# Patient Record
Sex: FEMALE | Race: WHITE | Employment: UNEMPLOYED | URBAN - METROPOLITAN AREA
[De-identification: names, ages, dates, MRNs, and addresses within clinical notes are randomized per-mention and may not be internally consistent; named-entity substitution may affect disease eponyms.]

---

## 2017-10-04 ENCOUNTER — TELEPHONE (OUTPATIENT)
Dept: FAMILY MEDICINE CLINIC | Age: 42
End: 2017-10-04

## 2017-10-04 NOTE — TELEPHONE ENCOUNTER
Hayley's sister is a current patient of  (Sintia Hazel) and would like to know if there is anyway  would accept her as a patient. She has [employer] and no chronic conditions but has been experiencing some leg pain.

## 2017-10-11 ENCOUNTER — OFFICE VISIT (OUTPATIENT)
Dept: FAMILY MEDICINE CLINIC | Age: 42
End: 2017-10-11

## 2017-10-11 VITALS
OXYGEN SATURATION: 99 % | SYSTOLIC BLOOD PRESSURE: 120 MMHG | HEART RATE: 76 BPM | WEIGHT: 196 LBS | DIASTOLIC BLOOD PRESSURE: 77 MMHG | BODY MASS INDEX: 27.44 KG/M2 | HEIGHT: 71 IN

## 2017-10-11 DIAGNOSIS — T14.8XXA BRUISING: ICD-10-CM

## 2017-10-11 DIAGNOSIS — Z23 NEEDS FLU SHOT: ICD-10-CM

## 2017-10-11 DIAGNOSIS — Z00.00 ROUTINE GENERAL MEDICAL EXAMINATION AT A HEALTH CARE FACILITY: Primary | ICD-10-CM

## 2017-10-11 DIAGNOSIS — M25.562 POSTERIOR LEFT KNEE PAIN: ICD-10-CM

## 2017-10-11 DIAGNOSIS — D18.02 INTRACRANIAL CAVERNOUS HEMANGIOMA (HCC): ICD-10-CM

## 2017-10-11 DIAGNOSIS — R00.2 PALPITATIONS: ICD-10-CM

## 2017-10-11 DIAGNOSIS — R79.89 DECREASED THYROID STIMULATING HORMONE (TSH) LEVEL: ICD-10-CM

## 2017-10-11 DIAGNOSIS — R20.0 NUMBNESS OF RIGHT FOOT: ICD-10-CM

## 2017-10-11 DIAGNOSIS — G43.909 MIGRAINE WITHOUT STATUS MIGRAINOSUS, NOT INTRACTABLE, UNSPECIFIED MIGRAINE TYPE: ICD-10-CM

## 2017-10-11 LAB
A/G RATIO: 1.4 (ref 1.1–2.2)
ALBUMIN SERPL-MCNC: 4.6 G/DL (ref 3.4–5)
ALP BLD-CCNC: 49 U/L (ref 40–129)
ALT SERPL-CCNC: 8 U/L (ref 10–40)
ANION GAP SERPL CALCULATED.3IONS-SCNC: 13 MMOL/L (ref 3–16)
APTT: 29.7 SEC (ref 24.1–34.9)
AST SERPL-CCNC: 12 U/L (ref 15–37)
BILIRUB SERPL-MCNC: 0.3 MG/DL (ref 0–1)
BUN BLDV-MCNC: 18 MG/DL (ref 7–20)
CALCIUM SERPL-MCNC: 9.8 MG/DL (ref 8.3–10.6)
CHLORIDE BLD-SCNC: 100 MMOL/L (ref 99–110)
CO2: 28 MMOL/L (ref 21–32)
CREAT SERPL-MCNC: 0.8 MG/DL (ref 0.6–1.1)
D DIMER: 262 NG/ML DDU (ref 0–229)
GFR AFRICAN AMERICAN: >60
GFR NON-AFRICAN AMERICAN: >60
GLOBULIN: 3.3 G/DL
GLUCOSE BLD-MCNC: 104 MG/DL (ref 70–99)
HCT VFR BLD CALC: 41.6 % (ref 36–48)
HEMOGLOBIN: 13.9 G/DL (ref 12–16)
INR BLD: 0.97 (ref 0.85–1.15)
MCH RBC QN AUTO: 30 PG (ref 26–34)
MCHC RBC AUTO-ENTMCNC: 33.5 G/DL (ref 31–36)
MCV RBC AUTO: 89.5 FL (ref 80–100)
PDW BLD-RTO: 13.7 % (ref 12.4–15.4)
PLATELET # BLD: 225 K/UL (ref 135–450)
PMV BLD AUTO: 10.2 FL (ref 5–10.5)
POTASSIUM SERPL-SCNC: 4.3 MMOL/L (ref 3.5–5.1)
PROTHROMBIN TIME: 11 SEC (ref 9.6–13)
RBC # BLD: 4.65 M/UL (ref 4–5.2)
SODIUM BLD-SCNC: 141 MMOL/L (ref 136–145)
T4 FREE: 1 NG/DL (ref 0.9–1.8)
THYROID PEROXIDASE (TPO) ABS: 19 IU/ML
TOTAL PROTEIN: 7.9 G/DL (ref 6.4–8.2)
TSH SERPL DL<=0.05 MIU/L-ACNC: 2.13 UIU/ML (ref 0.27–4.2)
WBC # BLD: 7.1 K/UL (ref 4–11)

## 2017-10-11 PROCEDURE — 99396 PREV VISIT EST AGE 40-64: CPT | Performed by: FAMILY MEDICINE

## 2017-10-11 PROCEDURE — 90686 IIV4 VACC NO PRSV 0.5 ML IM: CPT | Performed by: FAMILY MEDICINE

## 2017-10-11 PROCEDURE — 93000 ELECTROCARDIOGRAM COMPLETE: CPT | Performed by: FAMILY MEDICINE

## 2017-10-11 PROCEDURE — 90471 IMMUNIZATION ADMIN: CPT | Performed by: FAMILY MEDICINE

## 2017-10-11 RX ORDER — CLOMIPRAMINE HYDROCHLORIDE 50 MG/1
50 CAPSULE ORAL NIGHTLY
COMMUNITY
End: 2017-10-11 | Stop reason: CLARIF

## 2017-10-11 ASSESSMENT — PATIENT HEALTH QUESTIONNAIRE - PHQ9
SUM OF ALL RESPONSES TO PHQ9 QUESTIONS 1 & 2: 0
SUM OF ALL RESPONSES TO PHQ QUESTIONS 1-9: 0
1. LITTLE INTEREST OR PLEASURE IN DOING THINGS: 0
2. FEELING DOWN, DEPRESSED OR HOPELESS: 0

## 2017-10-11 NOTE — PROGRESS NOTES
SUBJECTIVE:   40 y.o. female for annual routine checkup. She is a new patient. Current Outpatient Prescriptions   Medication Sig Dispense Refill    clomiPHENE (CLOMID) 50 MG tablet 2 pills once per day 60 tablet 0     No current facility-administered medications for this visit. Allergies: Review of patient's allergies indicates no known allergies. No LMP recorded. Last PAP:due if  -GYN- Cr. Condorodis   History of Abnormal PAP: ASCUS only - ?? Prior mammogram: never  Sexually active: yes  Self breast exam:  Yes   LMP: 10/3/17 -   Smoker: no   Alcohol: not regular  Caffeine: 1-2 coffee/ day. Exercise: trying to be more regular 2-3d/ week. Her daughter is 3 yo. Her menses were q 28 days apart prior to daughter's birth. The last 6 months she has returned to q 1 month. Last 5 days . No bleeding between. . She had no problems with getting pregnant or with the pregnancy with 3 yo daughter. She had 2 miscarriages since then though. Her GYN recommended Clomid challenge. It was determined she was not ovulating per blood test. She has been referred to a fertility specialist - Dr. Dinesh Thomas on 10/23/17 . She was also referred to Dr. Mara Donaldson ,but has a call into his office. She has had 4 thyroid tests. She was seeing Dr. Kathia Willett for Gynecological care. Dr. Sonja Pop followed her during pregnancy. She gets palpitations occasionally , but only 3 times in her lifetime. Last occurred 3-4 months or so. Lasts < 30 seconds. No known precipitating factor. H/o migraines ,but last was 7-8 years ago. No regular headaches. Some bruising X 2 years or so. Some left hip and back pain X 5 months off and on . Some radiation to LLE to knee,but not daily. She had throbbing pain behind left knee last week and awoke her in the middle of the night. The next day she had bruising behind the knee. Right foot with coldness / numbness - great toe is the most noticeable.    No ulcerations. No calf pain or tenderness. No claudication. No visual problems or dizziness. No shortness of breath or chest pain . ROS:  Feeling well. No dyspnea or chest pain on exertion. No abdominal pain, change in bowel habits, black or bloody stools. No urinary tract symptoms. GYN ROS: normal menses, no abnormal bleeding, pelvic pain or discharge, no breast pain or new or enlarging lumps on self exam. No neurological complaints. See patient physical/  ROS questionnaire. Patient's allergies and medications were reviewed. Patient's past medical, surgical, social , and family history were reviewed. OBJECTIVE:   The patient appears well, alert, oriented x 3, in no distress, cooperative. /77 (Site: Left Arm, Position: Sitting, Cuff Size: Large Adult)   Pulse 76   Ht 5' 10.8\" (1.798 m)   Wt 196 lb (88.9 kg)   SpO2 99%   BMI 27.49 kg/m²   HEENT: normocephalic, atraumatic, PERRLA, EOMI, tympanic membranes and nasopharynx are normal.  Neck : supple. No adenopathy or thyromegaly. FROM. Upper extremities : DTRs 2+ biceps/ triceps/ brachioradialis bilateral.  FROM. Strength 5/5. Lungs are clear, good air entry, no wheezes, rhonchi or rales. Breathing comfortably. Cardiovascular: Regular rate  and rhythm. S1 and S2 are normal, no murmurs,rubs, and gallops. No edema. Abdomen is soft without tenderness, guarding, mass or organomegaly. Normal bowel sounds. Non distended. Back: Cervical, thoracic and lumbar spine exam is normal without tenderness, masses or kyphoscoliosis. Full range of motion without pain is noted. Lower extremities : DTRs 2+ knees and ankles bilateral.  FROM. Strength 5/5. Negative straight leg-raise. No edema or erythema bilateral.  normal peripheral pulses. Neuro: Cranial nerves 2-12 are normal. Deep tendon reflexes are 2+ and equal to all extremities. No focal sensory, or motor deficit noted. Skin: no rashes or suspicious lesions.      ASSESSMENT:   Daryl Marino

## 2017-10-11 NOTE — PROGRESS NOTES
Current Influenza vaccine VIS given to patient. Influenza consent form/questionnaire completed and signed. Patient responses to  Influenza consent form / questionnaire  reviewed. Vaccine given per protocol. Vaccine Information Sheet, \"Influenza - Inactivated\"  given to Ivan Paez, or parent/legal guardian of  Ivan Paez and verbalized understanding. Patient responses:    Have you ever had a reaction to a flu vaccine? No  Are you able to eat eggs without adverse effects? Yes  Do you have any current illness? No  Have you ever had Guillian Reedy Syndrome? No    Flu vaccine given per order. Please see immunization tab.

## 2017-10-12 DIAGNOSIS — R73.09 ELEVATED GLUCOSE: Primary | ICD-10-CM

## 2017-10-12 DIAGNOSIS — R79.89 DECREASED THYROID STIMULATING HORMONE (TSH) LEVEL: ICD-10-CM

## 2017-10-12 LAB
ESTIMATED AVERAGE GLUCOSE: 108.3 MG/DL
HBA1C MFR BLD: 5.4 %

## 2017-10-13 ENCOUNTER — TELEPHONE (OUTPATIENT)
Dept: FAMILY MEDICINE CLINIC | Age: 42
End: 2017-10-13

## 2017-10-13 ENCOUNTER — HOSPITAL ENCOUNTER (OUTPATIENT)
Dept: VASCULAR LAB | Age: 42
Discharge: HOME | End: 2017-10-13
Attending: FAMILY MEDICINE | Admitting: FAMILY MEDICINE

## 2017-10-13 DIAGNOSIS — M79.662 PAIN IN LEFT LOWER LEG: Primary | ICD-10-CM

## 2017-10-13 DIAGNOSIS — M25.562 PAIN IN LEFT KNEE: ICD-10-CM

## 2017-10-13 NOTE — TELEPHONE ENCOUNTER
The doppler is the better test. The D-dimer(blood test)  is not as specific and is therefore not as good of a test.

## 2017-10-17 ENCOUNTER — TELEPHONE (OUTPATIENT)
Dept: FAMILY MEDICINE CLINIC | Age: 42
End: 2017-10-17

## 2017-10-17 DIAGNOSIS — R20.0 NUMBNESS OF RIGHT FOOT: ICD-10-CM

## 2017-10-17 DIAGNOSIS — M79.605 LEFT LEG PAIN: Primary | ICD-10-CM

## 2017-10-17 NOTE — TELEPHONE ENCOUNTER
Please have her schedule EMG and have her schedule an appointment after completed several days later. If pain is severe or fever or edema , have her schedule an appointment prior to EMG test. [facility]

## 2017-10-18 NOTE — TELEPHONE ENCOUNTER
Spoke with patient, she is aware of EMG & what Dr. Leandra Bamberger is wanting to look at.  Patient is made an appointment to review all with Dr. Leandra Bamberger on Friday 10/20/17 @ 9:20am.

## 2017-10-20 ENCOUNTER — OFFICE VISIT (OUTPATIENT)
Dept: FAMILY MEDICINE CLINIC | Age: 42
End: 2017-10-20

## 2017-10-20 VITALS
HEART RATE: 80 BPM | BODY MASS INDEX: 27.55 KG/M2 | RESPIRATION RATE: 16 BRPM | TEMPERATURE: 98.1 F | OXYGEN SATURATION: 97 % | DIASTOLIC BLOOD PRESSURE: 74 MMHG | WEIGHT: 196.4 LBS | SYSTOLIC BLOOD PRESSURE: 106 MMHG

## 2017-10-20 DIAGNOSIS — M70.72 BURSITIS OF LEFT HIP, UNSPECIFIED BURSA: ICD-10-CM

## 2017-10-20 DIAGNOSIS — M76.32 ILIOTIBIAL BAND SYNDROME OF LEFT SIDE: Primary | ICD-10-CM

## 2017-10-20 PROCEDURE — 99214 OFFICE O/P EST MOD 30 MIN: CPT | Performed by: FAMILY MEDICINE

## 2017-10-20 NOTE — PATIENT INSTRUCTIONS
Version: 11.3  © 5363-0131 [employer]. Care instructions adapted under license by [facility] ([facility]). If you have questions about a medical condition or this instruction, always ask your healthcare professional. [address] any warranty or liability for your use of this information.

## 2017-10-20 NOTE — PROGRESS NOTES
distended. No hepatosplenomegaly. No CVA tenderness. Back: non tender. negative straight leg-raise . No pain with flexion, hyperextension or rotation. Lower extremities : DTRs 2+ knees and ankles bilateral.  FROM. Strength 5/5. Negative straight leg-raise. No edema or erythema bilateral. Tenderness to lateral left leg - lateral thigh and inferior to greater trochanter . Skin: no rashes. Non tender. ASSESSMENT/  PLAN:  Candis Siddiqi was seen today for other and hip pain. Diagnoses and all orders for this visit:    Iliotibial band syndrome of left side   -   Moist heat . ROM exercises- handout given. Modify activities. -   If not pregnant, can take Motrin 600 mg qid prn or Aleve 1-2 po bid prn.    -   Hold on EMG or Physical Therapy, but discussed. Bursitis of left hip, unspecified bursa   -   Moist heat . ROM exercises. Modify activities. -   If not pregnant, can take Motrin 600 mg qid prn or Aleve 1-2 po bid prn. Follow up if no improvement in 4 weeks/ as needed for increased symptoms.

## 2017-12-12 ENCOUNTER — TELEPHONE (OUTPATIENT)
Dept: FAMILY MEDICINE CLINIC | Age: 42
End: 2017-12-12

## 2017-12-12 NOTE — TELEPHONE ENCOUNTER
Pt called:  About 1 wk ago pt had what she thought was a blister on outside of lt ankle just under the ankle bone. About 2 days ago the spot started to itch and is now in the shape of a Z or 2. No drainage, not hot or warm,no fever, just itches. Pt did go on a curse from 11/19/2017 - retuning to [place] on 11/25/2017. Pt visited the [facility], [facility], and [place]. Blister/rash did not start till around 12/3/2017. Pt would like to know if she should try a OTC cream?    Pt is horace'd for tomorrow w/SM, however pt stated that if she did not need to be seen and could try OTC cream then she do that. Please advise.  Thanks

## 2017-12-13 ENCOUNTER — OFFICE VISIT (OUTPATIENT)
Dept: FAMILY MEDICINE CLINIC | Age: 42
End: 2017-12-13

## 2017-12-13 VITALS
OXYGEN SATURATION: 99 % | SYSTOLIC BLOOD PRESSURE: 101 MMHG | HEART RATE: 78 BPM | BODY MASS INDEX: 28.19 KG/M2 | WEIGHT: 201 LBS | DIASTOLIC BLOOD PRESSURE: 66 MMHG

## 2017-12-13 DIAGNOSIS — B35.4 TINEA CORPORIS: Primary | ICD-10-CM

## 2017-12-13 PROCEDURE — 99213 OFFICE O/P EST LOW 20 MIN: CPT | Performed by: FAMILY MEDICINE

## 2017-12-13 RX ORDER — LANOLIN ALCOHOL/MO/W.PET/CERES
50 CREAM (GRAM) TOPICAL DAILY
COMMUNITY
End: 2021-10-27

## 2017-12-13 NOTE — LETTER
[address] [place] [address] [place] [address]  Phone: [phone]  Fax: [phone]    Henny Sales MD                                                                                   December 13, 2017                       [address]      Dear Luan Fothergill:    Thank you for enrolling in [address] Please follow the instructions below to securely access your online medical record. [employer] allows you to send messages to your doctor, view your test results, renew your prescriptions, schedule appointments, and more. How Do I Sign Up? 1. In your Internet browser, go to [email or web address]  2. Click on the Sign Up Now link in the Sign In box. You will see the New Member Sign Up page. 3. Enter your [employer] Access Code exactly as it appears below. You will not need to use this code after youve completed the sign-up process. If you do not sign up before the expiration date, you must request a new code. [employer] Access Code: [email or web address]  Expires: 2/11/2018  3:32 PM    4. Enter your Social Security Number ([id number]) and Date of Birth (mm/dd/yyyy) as indicated and click Submit. You will be taken to the next sign-up page. 5. Create a [employer] ID. This will be your [employer] login ID and cannot be changed, so think of one that is secure and easy to remember. 6. Create a [employer] password. You can change your password at any time. 7. Enter your Password Reset Question and Answer. This can be used at a later time if you forget your password. 8. Enter your e-mail address. You will receive e-mail notification when new information is available in [address] 9. Click Sign Up. You can now view your medical record. Additional Information  If you have questions, please contact the physician practice where you receive care. Remember, [employer] is NOT to be used for urgent needs. For medical emergencies, dial 911. For questions regarding your [employer] account call [phone]. If you have a clinical question, please call your doctor's office.     Sincerely,  Cheyenne Espinoza MD

## 2018-01-04 ENCOUNTER — TELEPHONE (OUTPATIENT)
Dept: FAMILY MEDICINE CLINIC | Age: 43
End: 2018-01-04

## 2018-01-04 NOTE — TELEPHONE ENCOUNTER
As long as improving slowly and not increasing in size, I would continue Econazole cream prescribed. If increased size, this should likely be switched. It often takes 4+ weeks to resolve. Please ensure improving. Patient was seen by Dr. Angelo Ramos on 12/13/17.

## 2018-01-04 NOTE — TELEPHONE ENCOUNTER
Pt had appt on 12/13/17 regarding wingworm. Pt would like to know if she needs another medication or she needs to wait it out. Pt is having spots that go away and then more come in. She also states that she is aware that Dr. Shwetha Hairston states that it takes up to 3 weeks to heal and some meds do not work. Please advise. Thanks.

## 2018-01-05 RX ORDER — PRENATAL VIT 91/IRON/FOLIC/DHA 28-975-200
COMBINATION PACKAGE (EA) ORAL
Qty: 42 G | Refills: 0 | Status: SHIPPED | OUTPATIENT
Start: 2018-01-05 | End: 2021-10-27

## 2018-01-27 ENCOUNTER — PATIENT MESSAGE (OUTPATIENT)
Dept: FAMILY MEDICINE CLINIC | Age: 43
End: 2018-01-27

## 2018-01-30 ENCOUNTER — OFFICE VISIT (OUTPATIENT)
Dept: FAMILY MEDICINE CLINIC | Age: 43
End: 2018-01-30

## 2018-01-30 VITALS
DIASTOLIC BLOOD PRESSURE: 74 MMHG | TEMPERATURE: 98.9 F | RESPIRATION RATE: 14 BRPM | SYSTOLIC BLOOD PRESSURE: 109 MMHG | BODY MASS INDEX: 28.28 KG/M2 | WEIGHT: 201.6 LBS | HEART RATE: 105 BPM

## 2018-01-30 DIAGNOSIS — L30.1 DYSHIDROSIS: Primary | ICD-10-CM

## 2018-01-30 DIAGNOSIS — R21 RASH: ICD-10-CM

## 2018-01-30 PROCEDURE — 99213 OFFICE O/P EST LOW 20 MIN: CPT | Performed by: FAMILY MEDICINE

## 2018-01-30 RX ORDER — MOMETASONE FUROATE 1 MG/G
CREAM TOPICAL
Qty: 45 G | Refills: 0 | Status: SHIPPED | OUTPATIENT
Start: 2018-01-30 | End: 2021-10-27

## 2018-02-05 ENCOUNTER — PATIENT MESSAGE (OUTPATIENT)
Dept: FAMILY MEDICINE CLINIC | Age: 43
End: 2018-02-05

## 2018-02-05 ENCOUNTER — OFFICE VISIT (OUTPATIENT)
Dept: FAMILY MEDICINE CLINIC | Age: 43
End: 2018-02-05

## 2018-02-05 VITALS
RESPIRATION RATE: 14 BRPM | TEMPERATURE: 98.9 F | BODY MASS INDEX: 27.91 KG/M2 | OXYGEN SATURATION: 99 % | HEART RATE: 92 BPM | SYSTOLIC BLOOD PRESSURE: 113 MMHG | WEIGHT: 199 LBS | DIASTOLIC BLOOD PRESSURE: 69 MMHG

## 2018-02-05 DIAGNOSIS — R52 BODY ACHES: Primary | ICD-10-CM

## 2018-02-05 DIAGNOSIS — B76.9: ICD-10-CM

## 2018-02-05 DIAGNOSIS — J06.9 UPPER RESPIRATORY TRACT INFECTION, UNSPECIFIED TYPE: ICD-10-CM

## 2018-02-05 LAB
INFLUENZA A ANTIBODY: NEGATIVE
INFLUENZA B ANTIBODY: NORMAL

## 2018-02-05 PROCEDURE — 87804 INFLUENZA ASSAY W/OPTIC: CPT | Performed by: FAMILY MEDICINE

## 2018-02-05 PROCEDURE — 99213 OFFICE O/P EST LOW 20 MIN: CPT | Performed by: FAMILY MEDICINE

## 2018-02-05 RX ORDER — UBIDECARENONE 75 MG
CAPSULE ORAL
COMMUNITY
End: 2021-10-27

## 2018-02-05 NOTE — PROGRESS NOTES
fluids - water. -    Hold on antibiotics. Hookworm disease        -    S/p treatment per dermatologist . (Albendazole ? ). F/u if no improvement 4-5d/ prn increased symptoms/ otherwise if fever > 48 hours .

## 2018-02-06 NOTE — TELEPHONE ENCOUNTER
From: Sid Manrique  To: Berenice Waldron MD  Sent: 2/5/2018 6:29 PM EST  Subject: Visit Follow-Up Question    I have a question about POCT INFLUENZA A/B resulted on 2/5/18, 4:51 PM.    There is only one test that has a result of negative.  What about the other test?

## 2018-02-08 ENCOUNTER — PATIENT MESSAGE (OUTPATIENT)
Dept: FAMILY MEDICINE CLINIC | Age: 43
End: 2018-02-08

## 2018-02-08 NOTE — TELEPHONE ENCOUNTER
From: Dewayne Goodpasture  To: Ivonne Ruvalcaba MD  Sent: 2/8/2018 10:47 AM EST  Subject: Visit Follow-Up Question    My left ear is now hurting and both feel clogged. I also have congestion and sinus pressure. I am flying a week from today and need this gone ASAP. What can we do?    ----- Message -----  From: Floresita Lili  Sent: 2/6/18, 11:25 AM  To: Dewayne Goodpasture  Subject: RE: Visit Follow-Up Question    Jose Becerrai,  When we do a flu test in the office and it comes out negative for A/B we put it in the spot that appears first. I did verify with the medical assistant that ran your test and she confirmed it was negative for both. Have a great day! Rylie Dumas     ----- Message -----   From: Dewayne Goodpasture   Sent: 2/5/2018 6:29 PM EST   To: Ivonne Ruvalcaba MD  Subject: Visit Follow-Up Question    I have a question about POCT INFLUENZA A/B resulted on 2/5/18, 4:51 PM.    There is only one test that has a result of negative.  What about the other test?

## 2018-02-08 NOTE — TELEPHONE ENCOUNTER
Patient aware.  She is reluctant to try any nasal spray but will cont with OTC therapy and will call for appt if any sharp pains in the ear occur or if sxs are not better by Monday

## 2018-10-02 ENCOUNTER — TELEPHONE (OUTPATIENT)
Dept: FAMILY MEDICINE CLINIC | Age: 43
End: 2018-10-02

## 2018-10-03 ENCOUNTER — OFFICE VISIT (OUTPATIENT)
Dept: FAMILY MEDICINE CLINIC | Age: 43
End: 2018-10-03
Payer: COMMERCIAL

## 2018-10-03 VITALS
OXYGEN SATURATION: 98 % | SYSTOLIC BLOOD PRESSURE: 101 MMHG | HEART RATE: 81 BPM | RESPIRATION RATE: 16 BRPM | WEIGHT: 200.5 LBS | BODY MASS INDEX: 28.12 KG/M2 | TEMPERATURE: 99.3 F | DIASTOLIC BLOOD PRESSURE: 65 MMHG

## 2018-10-03 DIAGNOSIS — R42 DIZZINESS: Primary | ICD-10-CM

## 2018-10-03 DIAGNOSIS — J30.9 ALLERGIC RHINITIS, UNSPECIFIED SEASONALITY, UNSPECIFIED TRIGGER: ICD-10-CM

## 2018-10-03 DIAGNOSIS — N92.6 IRREGULAR MENSES: ICD-10-CM

## 2018-10-03 LAB
ALBUMIN SERPL-MCNC: 4.7 G/DL (ref 3.5–5)
ALP BLD-CCNC: 49 IU/L (ref 35–135)
ALT SERPL-CCNC: 14 IU/L (ref 10–60)
ANION GAP SERPL CALCULATED.3IONS-SCNC: 7 MMOL/L (ref 6–18)
AST SERPL-CCNC: 15 IU/L (ref 10–40)
BILIRUB SERPL-MCNC: 0.6 MG/DL (ref 0–1.2)
BUN BLDV-MCNC: 13 MG/DL (ref 8–26)
CALCIUM SERPL-MCNC: 9.3 MG/DL (ref 8.5–10.5)
CHLORIDE BLD-SCNC: 101 MEQ/L (ref 101–111)
CO2: 28 MMOL/L (ref 24–36)
CREAT SERPL-MCNC: 0.81 MG/DL (ref 0.44–1.03)
GFR AFRICAN AMERICAN: 102 ML/MIN/1.73 M2
GFR NON-AFRICAN AMERICAN: 89 ML/MIN/1.73 M2
GLUCOSE BLD-MCNC: 117 MG/DL (ref 70–99)
HCT VFR BLD CALC: 41.8 % (ref 36–46)
HEMOGLOBIN: 14.1 G/DL (ref 12–15.2)
MCH RBC QN AUTO: 30.2 PG (ref 27–33)
MCHC RBC AUTO-ENTMCNC: 33.7 G/DL (ref 32–36)
MCV RBC AUTO: 89.6 FL (ref 82–97)
PDW BLD-RTO: 12.9 %
PLATELET # BLD: 234 THOU/MCL (ref 140–375)
PMV BLD AUTO: 9.9 FL (ref 7.4–11.5)
POTASSIUM SERPL-SCNC: 4.3 MEQ/L (ref 3.6–5.1)
RBC # BLD: 4.66 MIL/MCL (ref 3.8–5.2)
SODIUM BLD-SCNC: 136 MEQ/L (ref 135–145)
TOTAL PROTEIN: 7.8 G/DL (ref 6–8)
WBC # BLD: 5.3 THOU/MCL (ref 3.6–10.5)

## 2018-10-03 PROCEDURE — 99214 OFFICE O/P EST MOD 30 MIN: CPT | Performed by: FAMILY MEDICINE

## 2018-10-03 RX ORDER — CETIRIZINE HYDROCHLORIDE 5 MG/1
5 TABLET ORAL DAILY
COMMUNITY
End: 2021-10-27

## 2018-10-03 ASSESSMENT — PATIENT HEALTH QUESTIONNAIRE - PHQ9
SUM OF ALL RESPONSES TO PHQ9 QUESTIONS 1 & 2: 0
SUM OF ALL RESPONSES TO PHQ QUESTIONS 1-9: 0
1. LITTLE INTEREST OR PLEASURE IN DOING THINGS: 0
2. FEELING DOWN, DEPRESSED OR HOPELESS: 0
SUM OF ALL RESPONSES TO PHQ QUESTIONS 1-9: 0

## 2018-10-03 NOTE — PROGRESS NOTES
labs-  CBC; Future,  Comprehensive Metabolic Panel; Future  - patient has orders for TSH from GYN. 2. Irregular menses  - has orders for TSH, FSH from Gynecology . Advised follow up with Gynecologist.     3. Allergic rhinitis, unspecified seasonality, unspecified trigger  - trial of Flonase NS qhs. May contribute some to dizziness. Monitor.   - Claritin or Allegra qd prn. Follow up if no improvement in 2 weeks/ as needed for increased symptoms.

## 2018-10-04 DIAGNOSIS — R73.09 ELEVATED GLUCOSE: Primary | ICD-10-CM

## 2018-10-05 ENCOUNTER — TELEPHONE (OUTPATIENT)
Dept: FAMILY MEDICINE CLINIC | Age: 43
End: 2018-10-05

## 2018-10-05 LAB
ESTIMATED AVERAGE GLUCOSE: 108 MG/DL
HBA1C MFR BLD: 5.4 % (ref 4.2–5.6)

## 2018-10-05 NOTE — TELEPHONE ENCOUNTER
Likely the sharp pain is musculoskeletal strain in nature. Have her take Aleve 1-2 po bid prn over the weekend. Advise to take with food. If no improvement advise follow up next week. If shortness of breath or significant chest pain occur, advised to go to ED. Offer an appointment if she prefers. My chart message stated HgbA1c , averages Blood sugars over a 3 month period. She had an elevated glucose in 2017 also.

## 2019-07-24 ENCOUNTER — OFFICE VISIT (OUTPATIENT)
Dept: FAMILY MEDICINE CLINIC | Age: 44
End: 2019-07-24
Payer: COMMERCIAL

## 2019-07-24 ENCOUNTER — HOSPITAL ENCOUNTER (OUTPATIENT)
Dept: VASCULAR LAB | Age: 44
Discharge: HOME | End: 2019-07-24
Payer: COMMERCIAL

## 2019-07-24 ENCOUNTER — TELEPHONE (OUTPATIENT)
Dept: FAMILY MEDICINE CLINIC | Age: 44
End: 2019-07-24

## 2019-07-24 VITALS
DIASTOLIC BLOOD PRESSURE: 62 MMHG | WEIGHT: 194.6 LBS | OXYGEN SATURATION: 97 % | SYSTOLIC BLOOD PRESSURE: 101 MMHG | RESPIRATION RATE: 12 BRPM | BODY MASS INDEX: 27.29 KG/M2 | TEMPERATURE: 99.7 F | HEART RATE: 73 BPM

## 2019-07-24 DIAGNOSIS — M79.602 LEFT ARM PAIN: Primary | ICD-10-CM

## 2019-07-24 DIAGNOSIS — R22.32 LOCALIZED SWELLING, MASS AND LUMP, LEFT UPPER LIMB: ICD-10-CM

## 2019-07-24 PROCEDURE — 99214 OFFICE O/P EST MOD 30 MIN: CPT | Performed by: FAMILY MEDICINE

## 2019-07-24 PROCEDURE — 93971 EXTREMITY STUDY: CPT

## 2019-07-24 ASSESSMENT — PATIENT HEALTH QUESTIONNAIRE - PHQ9
SUM OF ALL RESPONSES TO PHQ QUESTIONS 1-9: 0
SUM OF ALL RESPONSES TO PHQ QUESTIONS 1-9: 0
2. FEELING DOWN, DEPRESSED OR HOPELESS: 0
1. LITTLE INTEREST OR PLEASURE IN DOING THINGS: 0
SUM OF ALL RESPONSES TO PHQ9 QUESTIONS 1 & 2: 0

## 2019-09-06 ENCOUNTER — OFFICE VISIT (OUTPATIENT)
Dept: FAMILY MEDICINE CLINIC | Age: 44
End: 2019-09-06
Payer: COMMERCIAL

## 2019-09-06 VITALS
SYSTOLIC BLOOD PRESSURE: 104 MMHG | HEART RATE: 70 BPM | HEIGHT: 69 IN | BODY MASS INDEX: 29.86 KG/M2 | DIASTOLIC BLOOD PRESSURE: 67 MMHG | WEIGHT: 201.6 LBS | OXYGEN SATURATION: 98 %

## 2019-09-06 DIAGNOSIS — I80.8 SUPERFICIAL THROMBOPHLEBITIS OF LEFT UPPER EXTREMITY: Primary | ICD-10-CM

## 2019-09-06 DIAGNOSIS — G43.909 MIGRAINE WITHOUT STATUS MIGRAINOSUS, NOT INTRACTABLE, UNSPECIFIED MIGRAINE TYPE: ICD-10-CM

## 2019-09-06 PROBLEM — O09.90 SUPERVISION OF HIGH RISK PREGNANCY, ANTEPARTUM: Status: ACTIVE | Noted: 2019-01-09

## 2019-09-06 PROBLEM — O03.9 FETAL DEMISE DUE TO MISCARRIAGE: Status: ACTIVE | Noted: 2017-04-22

## 2019-09-06 PROBLEM — O34.219 HISTORY OF CESAREAN SECTION COMPLICATING PREGNANCY: Status: ACTIVE | Noted: 2019-03-06

## 2019-09-06 PROBLEM — J30.9 ALLERGIC RHINITIS: Status: ACTIVE | Noted: 2019-09-06

## 2019-09-06 PROBLEM — O09.521 ELDERLY MULTIGRAVIDA IN FIRST TRIMESTER: Status: ACTIVE | Noted: 2018-12-04

## 2019-09-06 PROBLEM — R79.89 ABNORMAL TSH: Status: ACTIVE | Noted: 2017-05-03

## 2019-09-06 PROBLEM — D18.00 CAVERNOUS HEMANGIOMA: Status: ACTIVE | Noted: 2019-09-06

## 2019-09-06 PROCEDURE — 99213 OFFICE O/P EST LOW 20 MIN: CPT | Performed by: FAMILY MEDICINE

## 2019-09-06 RX ORDER — OXYCODONE HYDROCHLORIDE 5 MG/1
TABLET ORAL
Refills: 0 | COMMUNITY
Start: 2019-06-27 | End: 2021-10-27

## 2019-09-06 RX ORDER — PRENATAL VIT 27,CALC/IRON/FA 60 MG-1 MG
1 TABLET ORAL
COMMUNITY
End: 2021-10-27

## 2019-09-06 RX ORDER — ASPIRIN 325 MG
325 TABLET ORAL
COMMUNITY
End: 2019-09-06

## 2019-09-06 NOTE — PROGRESS NOTES
Patient is here for follow up of left arm superficial clots. She had IV placed 19 for   . No left arm pain. Lumps are smaller now. No longer tender. Taking Aspirin 325 mg qd. No redness no swelling. No fever. Migraines are stable. No headache today. Review of Systems    ROS: All other systems were reviewed and are negative . Patient's allergies and medications were reviewed. Patient's past medical, surgical, social , and family history were reviewed. OBJECTIVE:  /67 (Site: Right Upper Arm, Position: Sitting, Cuff Size: Medium Adult)   Pulse 70   Ht 5' 9\" (1.753 m)   Wt 201 lb 9.6 oz (91.4 kg)   SpO2 98%   BMI 29.77 kg/m²     Physical Exam    General: NAD, cooperative, alert and oriented X 3. Mood / affect is good. good insight. well hydrated. Neck : no lymphadenopathy, supple, FROM  CV: Regular rate and rhythm , no murmurs/ rub/ gallop. No edema. Lungs : CTA bilaterally, breathing comfortably  Abdomen: positive bowel sounds, soft , non tender, non distended. No hepatosplenomegaly. No CVA tenderness. LUE : 1 cm nodule to forearm X 2 . Non tender. No erythema. No edema. Skin: no rashes. Non tender. ASSESSMENT/  PLAN:  1. Superficial thrombophlebitis of left upper extremity  - given > 30 days out and decreased size and non tender, stop Aspirin.   - monitor. 2. Migraine without status migrainosus, not intractable, unspecified migraine type  - stable. Follow up 6 months/ prn.

## 2019-11-19 ENCOUNTER — TELEPHONE (OUTPATIENT)
Dept: FAMILY MEDICINE CLINIC | Age: 44
End: 2019-11-19

## 2019-11-20 PROBLEM — I48.91 ATRIAL FIBRILLATION (HCC): Status: ACTIVE | Noted: 2019-11-01

## 2019-11-21 ENCOUNTER — OFFICE VISIT (OUTPATIENT)
Dept: FAMILY MEDICINE CLINIC | Age: 44
End: 2019-11-21
Payer: COMMERCIAL

## 2019-11-21 ENCOUNTER — TELEPHONE (OUTPATIENT)
Dept: FAMILY MEDICINE CLINIC | Age: 44
End: 2019-11-21

## 2019-11-21 VITALS
TEMPERATURE: 96.6 F | WEIGHT: 204.4 LBS | DIASTOLIC BLOOD PRESSURE: 72 MMHG | SYSTOLIC BLOOD PRESSURE: 110 MMHG | BODY MASS INDEX: 30.18 KG/M2 | HEART RATE: 98 BPM | OXYGEN SATURATION: 97 %

## 2019-11-21 DIAGNOSIS — N96 HISTORY OF RECURRENT MISCARRIAGES: ICD-10-CM

## 2019-11-21 DIAGNOSIS — E05.90 HYPERTHYROIDISM: Primary | ICD-10-CM

## 2019-11-21 DIAGNOSIS — I48.91 NEW ONSET ATRIAL FIBRILLATION (HCC): ICD-10-CM

## 2019-11-21 PROCEDURE — 1111F DSCHRG MED/CURRENT MED MERGE: CPT | Performed by: FAMILY MEDICINE

## 2019-11-21 PROCEDURE — 99214 OFFICE O/P EST MOD 30 MIN: CPT | Performed by: FAMILY MEDICINE

## 2019-11-22 ENCOUNTER — TELEPHONE (OUTPATIENT)
Dept: ENDOCRINOLOGY | Age: 44
End: 2019-11-22

## 2020-01-31 ENCOUNTER — OFFICE VISIT (OUTPATIENT)
Dept: FAMILY MEDICINE CLINIC | Age: 45
End: 2020-01-31
Payer: COMMERCIAL

## 2020-01-31 ENCOUNTER — TELEPHONE (OUTPATIENT)
Dept: FAMILY MEDICINE CLINIC | Age: 45
End: 2020-01-31

## 2020-01-31 VITALS
HEART RATE: 71 BPM | WEIGHT: 209 LBS | SYSTOLIC BLOOD PRESSURE: 122 MMHG | RESPIRATION RATE: 16 BRPM | BODY MASS INDEX: 30.86 KG/M2 | DIASTOLIC BLOOD PRESSURE: 80 MMHG | OXYGEN SATURATION: 99 %

## 2020-01-31 PROCEDURE — 99214 OFFICE O/P EST MOD 30 MIN: CPT | Performed by: FAMILY MEDICINE

## 2020-01-31 NOTE — PROGRESS NOTES
benefits. Patient voiced understanding and agrees with use. Barriers to medication compliance addressed. All the patient's questions were addressed. - discussed likely postpartum is underlying given no change with becoming hypothyroid or when on Tapazole. - sertraline (ZOLOFT) 50 MG tablet; Take 1 tablet by mouth daily  Dispense: 30 tablet; Refill: 1    2. Atrial fibrillation, unspecified type ([address]  - Stable. (normal sinus rhythm today). - Continue Metoprolol 12.5 mg bid( 1/2 of 25 mg bid). 3. Hyperthyroidism  - off Tapazole due to hypothyroidism occurred. - recheck thyroid studies the week of 2/14/20.   - Followed by Endocrinologist, Dr. Dilip Tipton. Follow up 4 -6 weeks/ prn.

## 2020-03-12 ENCOUNTER — PATIENT MESSAGE (OUTPATIENT)
Dept: FAMILY MEDICINE CLINIC | Age: 45
End: 2020-03-12

## 2020-03-12 NOTE — TELEPHONE ENCOUNTER
From: Saadia Cook  To:  Donya Carrera MD  Sent: 3/12/2020 12:11 PM EDT  Subject: Prescription Question    I am requesting a 3 month supply of the Zoloft due to precaution of the covid19 virus

## 2020-10-01 ENCOUNTER — PATIENT MESSAGE (OUTPATIENT)
Dept: FAMILY MEDICINE CLINIC | Age: 45
End: 2020-10-01

## 2020-10-01 NOTE — TELEPHONE ENCOUNTER
From: Julian Patient  To: Rosana Saravia MD  Sent: 10/1/2020 11:37 AM EDT  Subject: Prescription Question    I need a refill for the sertraline please.

## 2020-12-11 ENCOUNTER — TELEPHONE (OUTPATIENT)
Dept: FAMILY MEDICINE CLINIC | Age: 45
End: 2020-12-11

## 2020-12-15 ENCOUNTER — TELEPHONE (OUTPATIENT)
Dept: FAMILY MEDICINE CLINIC | Age: 45
End: 2020-12-15

## 2020-12-15 NOTE — TELEPHONE ENCOUNTER
Pt advised   Push fluids - water. Get plenty of rest and eat well balanced. You should self quarantine for 14 days from last exposure and if you develop symptoms , it is recommended you quarantine for 10 days from onset of symptoms and be fever free for >72 hours, which ever is longer.   It is recommended you hold on being COVID tested until symptoms occur and last > 24 hours to avoid a false positive test result  Okay per mm

## 2020-12-15 NOTE — TELEPHONE ENCOUNTER
Hayley called stating she tested positive for Covid 19. Her symptoms began on Thursday with afib in the am (she thought it was just related to her thyroid), cold symptoms, and a lower back ache. She lost her sense of smell on Saturday and was tested for Covid 19. She does not have any breathing problems, fever, or diarrhea. She wants to know if Dr. Jeff Monique has any recommendations and she would like to know what precautions she should be taking. Please advise. Thanks.           Nicol Coulter [phone] (home)

## 2020-12-22 ENCOUNTER — PATIENT MESSAGE (OUTPATIENT)
Dept: FAMILY MEDICINE CLINIC | Age: 45
End: 2020-12-22

## 2020-12-22 NOTE — TELEPHONE ENCOUNTER
From: Van Bhupinder  To: Margaret Cornejo MD  Sent: 12/22/2020 11:17 AM EST  Subject: Non-Urgent Medical Question    I have tested positive for covid Tuesday December 15 (tested on the previous Saturday). Currently I have sinus/congestion, stiff neck/should blades, and a chest cough. No fever. Loss of smell/taste. I have an oximeter that I check and my oxygen has stayed around 95-97. Should I be doing anything else besides eating balance diet and staying hydrated. Will start on a daily claritin and gave a rescue inhaler to try as needed.   If symptoms are not improving recommended evaluation to have a physical exam once quarantine period is over. Can consider spirometry or imaging if needed at that time.

## 2020-12-28 NOTE — TELEPHONE ENCOUNTER
Please advise    Is there any way to get a doctors note from you stating that everyone in my household, specifically my  Bill Rosen, who was exposed to me, needs/needed to quarantine for 2 weeks? Thank you.

## 2020-12-29 NOTE — TELEPHONE ENCOUNTER
Tested on the 12th and results came on the 15th. Work note for  14-15 - 27. When the patient received her positive result, the patient's  was asked to go home and to not return until 12/30. Patient is asking for a note to be sent to her through [employer] for her  stating she tested positive and therefore the rest of the family had to stay in quarantine.

## 2020-12-30 NOTE — TELEPHONE ENCOUNTER
Okay to do note for  , I am assuming with RTW on 12/30/2020. I am not sure he is a patient here though. Ideally his doctor would write the note, but it is fine to write it if needed.

## 2021-01-01 DIAGNOSIS — F41.8 POSTPARTUM ANXIETY: ICD-10-CM

## 2021-03-15 DIAGNOSIS — F41.8 POSTPARTUM ANXIETY: ICD-10-CM

## 2021-03-16 ENCOUNTER — PATIENT MESSAGE (OUTPATIENT)
Dept: FAMILY MEDICINE CLINIC | Age: 46
End: 2021-03-16

## 2021-09-03 DIAGNOSIS — F41.8 POSTPARTUM ANXIETY: ICD-10-CM

## 2021-10-18 ENCOUNTER — PATIENT MESSAGE (OUTPATIENT)
Dept: FAMILY MEDICINE CLINIC | Age: 46
End: 2021-10-18

## 2021-10-18 DIAGNOSIS — F41.8 POSTPARTUM ANXIETY: ICD-10-CM

## 2021-10-19 NOTE — TELEPHONE ENCOUNTER
From: Lemuel Perez  To: Lesvia Valentin MD  Sent: 10/18/2021 3:12 PM EDT  Subject: Visit Follow-Up Question    I have scheduled my follow up but prior to that appointment I am to get labs done. Do I need to fast? Are there specific instructions? Also, I will run out of my Zoloft prior to my appointment, can I have a refill called into the pharmacy until my appointment?

## 2021-10-25 DIAGNOSIS — R73.09 ELEVATED GLUCOSE: ICD-10-CM

## 2021-10-25 DIAGNOSIS — I48.91 ATRIAL FIBRILLATION, UNSPECIFIED TYPE (HCC): ICD-10-CM

## 2021-10-25 DIAGNOSIS — R79.89 ABNORMAL TSH: ICD-10-CM

## 2021-10-25 DIAGNOSIS — Z13.220 SCREENING CHOLESTEROL LEVEL: ICD-10-CM

## 2021-10-25 LAB
A/G RATIO: 1.6 (ref 1.1–2.2)
ALBUMIN SERPL-MCNC: 4.6 G/DL (ref 3.4–5)
ALP BLD-CCNC: 67 U/L (ref 40–129)
ALT SERPL-CCNC: 9 U/L (ref 10–40)
ANION GAP SERPL CALCULATED.3IONS-SCNC: 10 MMOL/L (ref 3–16)
AST SERPL-CCNC: 12 U/L (ref 15–37)
BILIRUB SERPL-MCNC: 0.3 MG/DL (ref 0–1)
BUN BLDV-MCNC: 12 MG/DL (ref 7–20)
CALCIUM SERPL-MCNC: 9.4 MG/DL (ref 8.3–10.6)
CHLORIDE BLD-SCNC: 102 MMOL/L (ref 99–110)
CHOLESTEROL, FASTING: 189 MG/DL (ref 0–199)
CO2: 25 MMOL/L (ref 21–32)
CREAT SERPL-MCNC: 0.7 MG/DL (ref 0.6–1.1)
GFR AFRICAN AMERICAN: >60
GFR NON-AFRICAN AMERICAN: >60
GLOBULIN: 2.8 G/DL
GLUCOSE FASTING: 95 MG/DL (ref 70–99)
HCT VFR BLD CALC: 39.1 % (ref 36–48)
HDLC SERPL-MCNC: 57 MG/DL (ref 40–60)
HEMOGLOBIN: 12.7 G/DL (ref 12–16)
LDL CHOLESTEROL CALCULATED: 113 MG/DL
MCH RBC QN AUTO: 29.5 PG (ref 26–34)
MCHC RBC AUTO-ENTMCNC: 32.6 G/DL (ref 31–36)
MCV RBC AUTO: 90.6 FL (ref 80–100)
PDW BLD-RTO: 13.9 % (ref 12.4–15.4)
PLATELET # BLD: 238 K/UL (ref 135–450)
PMV BLD AUTO: 10 FL (ref 5–10.5)
POTASSIUM SERPL-SCNC: 4.7 MMOL/L (ref 3.5–5.1)
RBC # BLD: 4.32 M/UL (ref 4–5.2)
SODIUM BLD-SCNC: 137 MMOL/L (ref 136–145)
T4 FREE: 1.4 NG/DL (ref 0.9–1.8)
TOTAL PROTEIN: 7.4 G/DL (ref 6.4–8.2)
TRIGLYCERIDE, FASTING: 95 MG/DL (ref 0–150)
TSH SERPL DL<=0.05 MIU/L-ACNC: 0.19 UIU/ML (ref 0.27–4.2)
VLDLC SERPL CALC-MCNC: 19 MG/DL
WBC # BLD: 4.5 K/UL (ref 4–11)

## 2021-10-26 LAB
ESTIMATED AVERAGE GLUCOSE: 108.3 MG/DL
HBA1C MFR BLD: 5.4 %

## 2021-10-27 ENCOUNTER — OFFICE VISIT (OUTPATIENT)
Dept: FAMILY MEDICINE CLINIC | Age: 46
End: 2021-10-27
Payer: COMMERCIAL

## 2021-10-27 VITALS
WEIGHT: 219.6 LBS | BODY MASS INDEX: 32.43 KG/M2 | OXYGEN SATURATION: 99 % | TEMPERATURE: 97 F | HEART RATE: 75 BPM | SYSTOLIC BLOOD PRESSURE: 100 MMHG | DIASTOLIC BLOOD PRESSURE: 68 MMHG | RESPIRATION RATE: 15 BRPM

## 2021-10-27 DIAGNOSIS — Z12.11 COLON CANCER SCREENING: ICD-10-CM

## 2021-10-27 DIAGNOSIS — Z12.31 ENCOUNTER FOR SCREENING MAMMOGRAM FOR MALIGNANT NEOPLASM OF BREAST: ICD-10-CM

## 2021-10-27 DIAGNOSIS — Z23 NEEDS FLU SHOT: ICD-10-CM

## 2021-10-27 DIAGNOSIS — F41.8 POSTPARTUM ANXIETY: Primary | ICD-10-CM

## 2021-10-27 DIAGNOSIS — R73.09 ELEVATED GLUCOSE: ICD-10-CM

## 2021-10-27 DIAGNOSIS — I48.91 ATRIAL FIBRILLATION, UNSPECIFIED TYPE (HCC): ICD-10-CM

## 2021-10-27 DIAGNOSIS — E03.9 ACQUIRED HYPOTHYROIDISM: ICD-10-CM

## 2021-10-27 PROCEDURE — 90686 IIV4 VACC NO PRSV 0.5 ML IM: CPT | Performed by: FAMILY MEDICINE

## 2021-10-27 PROCEDURE — 99214 OFFICE O/P EST MOD 30 MIN: CPT | Performed by: FAMILY MEDICINE

## 2021-10-27 PROCEDURE — 90471 IMMUNIZATION ADMIN: CPT | Performed by: FAMILY MEDICINE

## 2021-10-27 RX ORDER — SERTRALINE HYDROCHLORIDE 100 MG/1
100 TABLET, FILM COATED ORAL DAILY
Qty: 30 TABLET | Refills: 1 | Status: SHIPPED | OUTPATIENT
Start: 2021-10-27 | End: 2022-04-17 | Stop reason: SDUPTHER

## 2021-10-27 RX ORDER — LEVOTHYROXINE SODIUM 0.05 MG/1
TABLET ORAL
COMMUNITY
Start: 2021-08-16 | End: 2022-09-22

## 2021-10-27 RX ORDER — METOPROLOL SUCCINATE 25 MG/1
25 TABLET, EXTENDED RELEASE ORAL DAILY
COMMUNITY
Start: 2021-05-18

## 2021-10-27 SDOH — ECONOMIC STABILITY: FOOD INSECURITY: WITHIN THE PAST 12 MONTHS, THE FOOD YOU BOUGHT JUST DIDN'T LAST AND YOU DIDN'T HAVE MONEY TO GET MORE.: NEVER TRUE

## 2021-10-27 SDOH — ECONOMIC STABILITY: FOOD INSECURITY: WITHIN THE PAST 12 MONTHS, YOU WORRIED THAT YOUR FOOD WOULD RUN OUT BEFORE YOU GOT MONEY TO BUY MORE.: NEVER TRUE

## 2021-10-27 ASSESSMENT — PATIENT HEALTH QUESTIONNAIRE - PHQ9
2. FEELING DOWN, DEPRESSED OR HOPELESS: 0
1. LITTLE INTEREST OR PLEASURE IN DOING THINGS: 0
SUM OF ALL RESPONSES TO PHQ QUESTIONS 1-9: 0
SUM OF ALL RESPONSES TO PHQ9 QUESTIONS 1 & 2: 0
SUM OF ALL RESPONSES TO PHQ QUESTIONS 1-9: 0
SUM OF ALL RESPONSES TO PHQ QUESTIONS 1-9: 0

## 2021-10-27 ASSESSMENT — SOCIAL DETERMINANTS OF HEALTH (SDOH): HOW HARD IS IT FOR YOU TO PAY FOR THE VERY BASICS LIKE FOOD, HOUSING, MEDICAL CARE, AND HEATING?: NOT HARD AT ALL

## 2021-10-27 NOTE — PROGRESS NOTES
Patient is here for hypothyroidism and weight gain. Admits to feeling tired the next day when she skips Levothyroxine on Sundays - 50 mcg qd. Takes it 6 days per week. She initially thought the Zoloft did great and \"wonders\" for her. She notices more anxiety now though. More irritable due to anxious. Not depressed. Her  comments and notices her anxiety . He is very laid back. Children are 10 yo and 2.6 yo on [place]. Able to walk in the neighborhood at times, but no regular exercise. She has increased weight 10 lbs in the past 2 years. She is struggling to lose weight. She was 43 yo when she had her 2.6 yo. She is good about diet for the most part. No h/o seizures. 1-2 coffee/ caffeine per day. She struggles with sleep, particularly with 10 yo with separation anxiety who sleeps in bed with her. She is a stay at home Mom.  10 yo is very territorial , including with 2.6 yo sister. Denies fever, chest pain , shortness of breath or cough. Review of Systems    ROS: All other systems were reviewed and are negative . Patient's allergies and medications were reviewed. Patient's past medical, surgical, social , and family history were reviewed. Wt Readings from Last 3 Encounters:   10/27/21 219 lb 9.6 oz (99.6 kg)   01/31/20 209 lb (94.8 kg)   11/21/19 204 lb 6.4 oz (92.7 kg)     OBJECTIVE:  /68   Pulse 75   Temp 97 °F (36.1 °C)   Resp 15   Wt 219 lb 9.6 oz (99.6 kg)   LMP 10/22/2021 (Exact Date)   SpO2 99%   BMI 32.43 kg/m²     Physical Exam    General: NAD, cooperative, alert and oriented X 3. Mood / affect is good. good insight. well hydrated. Neck : no lymphadenopathy, supple, FROM  CV: Regular rate and rhythm , no murmurs/ rub/ gallop. No edema. Lungs : CTA bilaterally, breathing comfortably  Abdomen: positive bowel sounds, soft , non tender, non distended. No hepatosplenomegaly. No CVA tenderness. Skin: no rashes. Non tender. ASSESSMENT/  PLAN:  1. Postpartum anxiety  - Increase Zoloft to 1.5 pills (75 mg) qd X 1-2 weeks then 100 mg qd. - sertraline (ZOLOFT) 100 MG tablet; Take 1 tablet by mouth daily  Dispense: 30 tablet; Refill: 1  - Encouraged to get children, including 10 yo, to sleep in her own bed to improve both quality of sleep. 2. Atrial fibrillation, unspecified type ([facility] [address]  - Stable. 3. Elevated glucose  - HgbA1c = 5.4 , which is normal.     4. Acquired hypothyroidism  - Change Levothyroxine to 0.050 mg qd except 0.025 mg on Saturday and Sunday ( or 2 days per week). 5. Needs flu shot  - INFLUENZA, QUADV, 0.5ML, 6 MO AND OLDER, IM, PF, PREFILL SYR OR SDV (FLUZONE QUADV, PF)    6. Encounter for screening mammogram for malignant neoplasm of breast  - ELIZABETH DIGITAL SCREEN W OR WO CAD BILATERAL; Future and follow up after completed/ prn.     7. Colon cancer screening  - Referral - Hugh Padgett MD, Gastroenterology, [place]    Follow up 4 -6 weeks/ prn.

## 2021-11-29 DIAGNOSIS — I48.91 ATRIAL FIBRILLATION, UNSPECIFIED TYPE (HCC): Primary | ICD-10-CM

## 2021-11-29 RX ORDER — FLECAINIDE ACETATE 50 MG/1
TABLET ORAL
Qty: 120 TABLET | Refills: 5
Start: 2021-11-29

## 2021-12-03 ENCOUNTER — OFFICE VISIT (OUTPATIENT)
Dept: FAMILY MEDICINE CLINIC | Age: 46
End: 2021-12-03
Payer: COMMERCIAL

## 2021-12-03 ENCOUNTER — TELEPHONE (OUTPATIENT)
Dept: FAMILY MEDICINE CLINIC | Age: 46
End: 2021-12-03

## 2021-12-03 VITALS
HEART RATE: 67 BPM | BODY MASS INDEX: 31.96 KG/M2 | TEMPERATURE: 96.8 F | WEIGHT: 215.8 LBS | HEIGHT: 69 IN | RESPIRATION RATE: 12 BRPM | DIASTOLIC BLOOD PRESSURE: 70 MMHG | OXYGEN SATURATION: 98 % | SYSTOLIC BLOOD PRESSURE: 100 MMHG

## 2021-12-03 DIAGNOSIS — R53.83 FATIGUE, UNSPECIFIED TYPE: ICD-10-CM

## 2021-12-03 DIAGNOSIS — I48.91 ATRIAL FIBRILLATION, UNSPECIFIED TYPE (HCC): Primary | ICD-10-CM

## 2021-12-03 DIAGNOSIS — R06.83 SNORING: ICD-10-CM

## 2021-12-03 PROCEDURE — 99214 OFFICE O/P EST MOD 30 MIN: CPT | Performed by: FAMILY MEDICINE

## 2021-12-03 PROCEDURE — G8417 CALC BMI ABV UP PARAM F/U: HCPCS | Performed by: FAMILY MEDICINE

## 2021-12-03 PROCEDURE — G8482 FLU IMMUNIZE ORDER/ADMIN: HCPCS | Performed by: FAMILY MEDICINE

## 2021-12-03 PROCEDURE — 1111F DSCHRG MED/CURRENT MED MERGE: CPT | Performed by: FAMILY MEDICINE

## 2021-12-03 PROCEDURE — 93000 ELECTROCARDIOGRAM COMPLETE: CPT | Performed by: FAMILY MEDICINE

## 2021-12-03 PROCEDURE — G8427 DOCREV CUR MEDS BY ELIG CLIN: HCPCS | Performed by: FAMILY MEDICINE

## 2021-12-03 PROCEDURE — 1036F TOBACCO NON-USER: CPT | Performed by: FAMILY MEDICINE

## 2021-12-03 NOTE — TELEPHONE ENCOUNTER
Please inform the patient of below information, including no change in medication and date of upcoming appointment .

## 2021-12-03 NOTE — PROGRESS NOTES
Patient is here for follow up of atrial fibrillation. [facility] and saw a cardiologist, Dr. Marek Johnston,  there. She was on Metoprolol XL 25 mg qd and added Flecainide 50 mg 2 pills po bid and Eliquis 5 mg bid. She was placed on a Cardiazem drip. She had 1 prior episode of Atrial fibrillation upon awakening and lasted several hours - 2 years ago. This episode occurred about 6 pm after eating . She had 1 glass of wine . No caffeine. Has occasional snoring but not nightly . Fatigue X years. Her dad has a cardiologist and has CAD and valvular disease. She is frustrated due to cardiology is not calling her back to schedule a follow up appointment . Denies fever, chest pain , shortness of breath or cough. Review of Systems    ROS: All other systems were reviewed and are negative . Patient's allergies and medications were reviewed. Patient's past medical, surgical, social , and family history were reviewed. OBJECTIVE:  /70   Pulse 67   Temp 96.8 °F (36 °C)   Resp 12   Ht 5' 9\" (1.753 m)   Wt 215 lb 12.8 oz (97.9 kg)   SpO2 98%   BMI 31.87 kg/m²     Physical Exam    General: NAD, cooperative, alert and oriented X 3. Mood / affect is good. good insight. well hydrated. Neck : no lymphadenopathy, supple, FROM  CV: Regular rate and rhythm , no murmurs/ rub/ gallop. No edema. Lungs : CTA bilaterally, breathing comfortably  Abdomen: positive bowel sounds, soft , non tender, non distended. No hepatosplenomegaly. No CVA tenderness. Skin: no rashes. Non tender. ASSESSMENT/  PLAN:  1. Atrial fibrillation, unspecified type [facility])  - Called cardiologist office and faxed EKG. - Advised to continue Metoprolol XL 25 mg qd, Flecainide 2 pills po bid and Eliquis bid . - A follow up appointment with Cardiologist was scheduled for 12/8/21 at 1 pm.  - EKG 12 Lead    2.  Snoring  - Referral -  Korin Mejia MD,

## 2021-12-03 NOTE — TELEPHONE ENCOUNTER
Please advise  Thank you    's office called and I spoke with Rosa Wooten (nurse) who stated that [facility] a colleague of  reviewed Hayley's EKG today said it was ok and No change in her medication. She has appointment with  on 12/8/21 @ 1:00pm. We can call 's nurse Jelly Mata) if we have any other questions [phone].

## 2021-12-03 NOTE — TELEPHONE ENCOUNTER
Pt advised. She stated the appt was made without her knowing and that time doesn't work for her. Their office was calling her while I was talking to her. She said she would update me with new appt info.

## 2022-01-12 DIAGNOSIS — F41.8 POSTPARTUM ANXIETY: ICD-10-CM

## 2022-01-12 NOTE — TELEPHONE ENCOUNTER
Requested Prescriptions     Pending Prescriptions Disp Refills    sertraline (ZOLOFT) 50 MG tablet [Pharmacy Med Name: SERTRALINE HCL 50 MG TABLET] 90 tablet 0     Sig: TAKE 1 TABLET BY MOUTH EVERY DAY. FOLLOW UP APPOINTMENT IS NEEDED.      Last ov 12/3/2021  Last labs 10/25/2021

## 2022-04-17 DIAGNOSIS — F41.8 POSTPARTUM ANXIETY: ICD-10-CM

## 2022-04-17 DIAGNOSIS — I48.91 ATRIAL FIBRILLATION, UNSPECIFIED TYPE (HCC): ICD-10-CM

## 2022-04-18 RX ORDER — SERTRALINE HYDROCHLORIDE 100 MG/1
100 TABLET, FILM COATED ORAL DAILY
Qty: 30 TABLET | Refills: 2 | Status: SHIPPED | OUTPATIENT
Start: 2022-04-18 | End: 2022-10-18

## 2022-06-28 DIAGNOSIS — F41.8 POSTPARTUM ANXIETY: ICD-10-CM

## 2022-06-28 NOTE — TELEPHONE ENCOUNTER
12/03/2021 LOV  No Scheduled FOV  Labs: 10/25/2021 - Completed    Message sent to the patient for Follow Up Appointment. Medication pending with No Refills.

## 2022-07-20 DIAGNOSIS — F41.8 POSTPARTUM ANXIETY: ICD-10-CM

## 2022-07-20 RX ORDER — SERTRALINE HYDROCHLORIDE 100 MG/1
TABLET, FILM COATED ORAL
Qty: 30 TABLET | Refills: 2 | OUTPATIENT
Start: 2022-07-20

## 2022-07-20 NOTE — TELEPHONE ENCOUNTER
Requested Prescriptions     Pending Prescriptions Disp Refills    sertraline (ZOLOFT) 100 MG tablet [Pharmacy Med Name: SERTRALINE  MG TABLET] 30 tablet 2     Sig: TAKE 1 TABLET BY MOUTH EVERY DAY     Last OV-12/3/2021  Labs- 10/25/21  NFOV

## 2022-09-20 ENCOUNTER — TELEPHONE (OUTPATIENT)
Dept: FAMILY MEDICINE CLINIC | Age: 47
End: 2022-09-20

## 2022-09-20 DIAGNOSIS — I48.91 ATRIAL FIBRILLATION, UNSPECIFIED TYPE (HCC): ICD-10-CM

## 2022-09-20 RX ORDER — FLECAINIDE ACETATE 50 MG/1
TABLET ORAL
Qty: 360 TABLET | OUTPATIENT
Start: 2022-09-20

## 2022-09-20 NOTE — TELEPHONE ENCOUNTER
Requested Prescriptions     Pending Prescriptions Disp Refills    flecainide (TAMBOCOR) 50 MG tablet [Pharmacy Med Name: FLECAINIDE ACETATE 50 MG TAB] 360 tablet      Sig: TAKE 2 TABLETS BY MOUTH TWICE A DAY     Last ov 12/3/2021  Last labs 10/25/21    Left message on pt voicemail to return call to the office to schedule an appt

## 2022-09-21 NOTE — TELEPHONE ENCOUNTER
Flecainide RX ideally should be prescribed by her cardiologist. I prescribed in the past due to problems getting it from cardiologist's office. I refused RX therefore. Please inform the patient . (I believe cardiologist is Dr. Aj Ying).

## 2022-09-22 DIAGNOSIS — E03.9 HYPOTHYROIDISM, UNSPECIFIED: ICD-10-CM

## 2022-09-22 RX ORDER — LEVOTHYROXINE SODIUM 50 MCG
TABLET ORAL
Qty: 90 TABLET | Refills: 0 | Status: SHIPPED | OUTPATIENT
Start: 2022-09-22

## 2022-09-22 NOTE — TELEPHONE ENCOUNTER
Requested Prescriptions     Pending Prescriptions Disp Refills    SYNTHROID 50 MCG tablet [Pharmacy Med Name: SYNTHROID 50 MCG TABLET] 90 tablet 1     Sig: TAKE 1 TABLET BY MOUTH EVERY DAY     Last OV-12/3/2021  Labs- 10/25/21  NFOV

## 2022-09-23 DIAGNOSIS — F41.8 POSTPARTUM ANXIETY: ICD-10-CM

## 2022-09-23 NOTE — TELEPHONE ENCOUNTER
Requested Prescriptions     Pending Prescriptions Disp Refills    sertraline (ZOLOFT) 50 MG tablet [Pharmacy Med Name: SERTRALINE HCL 50 MG TABLET] 90 tablet 0     Sig: TAKE 1 TABLET BY MOUTH EVERY DAY     Last ov 12/3/2021  Last labs 10/25/21    No f/u appt scheduled. Multiple messages sent through [employer] and phone calls made to pt to schedule appt.

## 2022-10-18 DIAGNOSIS — F41.8 POSTPARTUM ANXIETY: ICD-10-CM

## 2022-10-18 RX ORDER — SERTRALINE HYDROCHLORIDE 100 MG/1
TABLET, FILM COATED ORAL
Qty: 30 TABLET | Refills: 2 | Status: SHIPPED | OUTPATIENT
Start: 2022-10-18

## 2022-10-18 NOTE — TELEPHONE ENCOUNTER
Requested Prescriptions     Pending Prescriptions Disp Refills    sertraline (ZOLOFT) 100 MG tablet [Pharmacy Med Name: SERTRALINE  MG TABLET] 30 tablet 2     Sig: TAKE 1 TABLET BY MOUTH EVERY DAY     Last ov 12/3/2021  Last labs 10/25/21

## 2022-12-21 DIAGNOSIS — F41.8 POSTPARTUM ANXIETY: ICD-10-CM

## 2022-12-21 DIAGNOSIS — E03.9 HYPOTHYROIDISM, UNSPECIFIED: ICD-10-CM

## 2022-12-21 RX ORDER — LEVOTHYROXINE SODIUM 50 MCG
TABLET ORAL
Qty: 30 TABLET | Refills: 0 | Status: SHIPPED | OUTPATIENT
Start: 2022-12-21

## 2022-12-21 NOTE — TELEPHONE ENCOUNTER
Requested Prescriptions     Pending Prescriptions Disp Refills    SYNTHROID 50 MCG tablet [Pharmacy Med Name: SYNTHROID 50 MCG TABLET] 30 tablet 0     Sig: TAKE 1 TABLET BY MOUTH EVERY DAY. FOLLOW UP APPOINTMENT AND LABS ARE NEEDED.     sertraline (ZOLOFT) 50 MG tablet [Pharmacy Med Name: SERTRALINE HCL 50 MG TABLET] 30 tablet 0     Sig: TAKE 1 TABLET BY MOUTH EVERY DAY        Last ov 12/3/21  Last lab 10/25/21  Next ov n/a      Message sent through [employer] advising the pt to schedule an appt    Thank you

## 2023-01-25 DIAGNOSIS — F41.8 POSTPARTUM ANXIETY: ICD-10-CM

## 2023-01-25 DIAGNOSIS — E03.9 HYPOTHYROIDISM, UNSPECIFIED: ICD-10-CM

## 2023-01-25 RX ORDER — LEVOTHYROXINE SODIUM 50 MCG
TABLET ORAL
Qty: 30 TABLET | Refills: 0 | OUTPATIENT
Start: 2023-01-25

## 2023-01-25 NOTE — TELEPHONE ENCOUNTER
Requested Prescriptions     Pending Prescriptions Disp Refills    sertraline (ZOLOFT) 50 MG tablet [Pharmacy Med Name: SERTRALINE HCL 50 MG TABLET] 30 tablet 0     Sig: TAKE 1 TABLET BY MOUTH EVERY DAY   Pt needs appt    Thank you

## 2023-02-26 DIAGNOSIS — E03.9 HYPOTHYROIDISM, UNSPECIFIED: ICD-10-CM

## 2023-02-27 RX ORDER — LEVOTHYROXINE SODIUM 50 MCG
TABLET ORAL
Qty: 30 TABLET | Refills: 0 | OUTPATIENT
Start: 2023-02-27

## 2023-02-27 NOTE — TELEPHONE ENCOUNTER
Requested Prescriptions     Pending Prescriptions Disp Refills    SYNTHROID 50 MCG tablet [Pharmacy Med Name: SYNTHROID 50 MCG TABLET] 30 tablet 0     Sig: TAKE 1 TABLET BY MOUTH EVERY DAY. FOLLOW UP APPOINTMENT AND LABS ARE NEEDED.     Last OV-12/3/2021  Labs- 10/25/21  FOV-2/28/23

## 2023-02-28 ENCOUNTER — OFFICE VISIT (OUTPATIENT)
Dept: FAMILY MEDICINE CLINIC | Age: 48
End: 2023-02-28

## 2023-02-28 VITALS
OXYGEN SATURATION: 96 % | BODY MASS INDEX: 32.94 KG/M2 | HEIGHT: 69 IN | WEIGHT: 222.4 LBS | SYSTOLIC BLOOD PRESSURE: 127 MMHG | HEART RATE: 69 BPM | TEMPERATURE: 96.8 F | DIASTOLIC BLOOD PRESSURE: 68 MMHG

## 2023-02-28 DIAGNOSIS — Z12.11 COLON CANCER SCREENING: ICD-10-CM

## 2023-02-28 DIAGNOSIS — F41.8 POSTPARTUM ANXIETY: ICD-10-CM

## 2023-02-28 DIAGNOSIS — Z11.59 NEED FOR HEPATITIS C SCREENING TEST: ICD-10-CM

## 2023-02-28 DIAGNOSIS — E03.9 HYPOTHYROIDISM, UNSPECIFIED TYPE: ICD-10-CM

## 2023-02-28 DIAGNOSIS — R73.09 ELEVATED GLUCOSE: ICD-10-CM

## 2023-02-28 DIAGNOSIS — Z00.00 ROUTINE GENERAL MEDICAL EXAMINATION AT A HEALTH CARE FACILITY: Primary | ICD-10-CM

## 2023-02-28 DIAGNOSIS — I48.91 ATRIAL FIBRILLATION, UNSPECIFIED TYPE (HCC): ICD-10-CM

## 2023-02-28 DIAGNOSIS — Z13.220 SCREENING CHOLESTEROL LEVEL: ICD-10-CM

## 2023-02-28 RX ORDER — LEVOTHYROXINE SODIUM 50 MCG
TABLET ORAL
Qty: 30 TABLET | Refills: 0 | Status: SHIPPED | OUTPATIENT
Start: 2023-02-28

## 2023-02-28 SDOH — ECONOMIC STABILITY: HOUSING INSECURITY
IN THE LAST 12 MONTHS, WAS THERE A TIME WHEN YOU DID NOT HAVE A STEADY PLACE TO SLEEP OR SLEPT IN A SHELTER (INCLUDING NOW)?: NO

## 2023-02-28 SDOH — ECONOMIC STABILITY: FOOD INSECURITY: WITHIN THE PAST 12 MONTHS, YOU WORRIED THAT YOUR FOOD WOULD RUN OUT BEFORE YOU GOT MONEY TO BUY MORE.: NEVER TRUE

## 2023-02-28 SDOH — ECONOMIC STABILITY: INCOME INSECURITY: HOW HARD IS IT FOR YOU TO PAY FOR THE VERY BASICS LIKE FOOD, HOUSING, MEDICAL CARE, AND HEATING?: SOMEWHAT HARD

## 2023-02-28 SDOH — ECONOMIC STABILITY: FOOD INSECURITY: WITHIN THE PAST 12 MONTHS, THE FOOD YOU BOUGHT JUST DIDN'T LAST AND YOU DIDN'T HAVE MONEY TO GET MORE.: NEVER TRUE

## 2023-02-28 ASSESSMENT — PATIENT HEALTH QUESTIONNAIRE - PHQ9
SUM OF ALL RESPONSES TO PHQ QUESTIONS 1-9: 0
1. LITTLE INTEREST OR PLEASURE IN DOING THINGS: 0
SUM OF ALL RESPONSES TO PHQ9 QUESTIONS 1 & 2: 0
SUM OF ALL RESPONSES TO PHQ QUESTIONS 1-9: 0
2. FEELING DOWN, DEPRESSED OR HOPELESS: 0

## 2023-02-28 NOTE — PROGRESS NOTES
SUBJECTIVE:   53 y.o. female for annual routine  checkup. Current Outpatient Medications   Medication Sig Dispense Refill    SYNTHROID 50 MCG tablet TAKE 1 TABLET BY MOUTH EVERY DAY. FOLLOW UP APPOINTMENT AND LABS ARE NEEDED. 30 tablet 0    sertraline (ZOLOFT) 50 MG tablet TAKE 1 TABLET BY MOUTH EVERY DAY 30 tablet 0    sertraline (ZOLOFT) 100 MG tablet TAKE 1 TABLET BY MOUTH EVERY DAY 30 tablet 2    apixaban (ELIQUIS) 5 MG TABS tablet Take 1 tablet by mouth 2 times daily 60 tablet 2    flecainide (TAMBOCOR) 50 MG tablet 2 pills po bid 120 tablet 5    metoprolol succinate (TOPROL XL) 25 MG extended release tablet Take 25 mg by mouth daily       No current facility-administered medications for this visit. Allergies: Patient has no known allergies. No LMP recorded. (Menstrual status: Irregular periods). Last PAP:7/21  History of Abnormal PAP: ASCUS only   Prior mammogram: 1/23  Sexually active: yes   LMP: irregular - seeing GYN in 6/23 - q 1-4 months . No significant hot flashes. Mom likely 48 with menopause. Smoker: no  Alcohol: occasionally - 1-2 /month   Caffeine: 1-2 coffee/ day   Exercise: not regular    Takes 1/2 pill on Sunday / Monday of Synthroid SCHUYLER 0.05 mg and then 0.050 mg daily. ROS:  Feeling well. No dyspnea or chest pain on exertion. No abdominal pain, change in bowel habits, black or bloody stools. No urinary tract symptoms. GYN ROS: no breast pain or new or enlarging lumps on self exam. No neurological complaints. See patient physical/  ROS questionnaire. Patient's allergies and medications were reviewed. Patient's past medical, surgical, social , and family history were reviewed. OBJECTIVE:   The patient appears well, alert, oriented x 3, in no distress, cooperative. /68   Pulse 69   Temp 96.8 °F (36 °C)   Ht 5' 9\" (1.753 m)   Wt 222 lb 6.4 oz (100.9 kg)   SpO2 96%   BMI 32.84 kg/m²    There were no vitals filed for this visit.     HEENT: normocephalic, atraumatic, PERRLA, EOMI, tympanic membranes and nasopharynx are normal.  Neck : supple. No adenopathy or thyromegaly. FROM. Upper extremities : DTRs 2+ biceps/ triceps/ brachioradialis bilateral.  FROM. Strength 5/5. Lungs are clear, good air entry, no wheezes, rhonchi or rales. Breathing comfortably. Cardiovascular: Regular rate  and rhythm. S1 and S2 are normal, no murmurs,rubs, and gallops. No edema. Abdomen is soft without tenderness, guarding, mass or organomegaly. Normal bowel sounds. Non distended. Back: Cervical, thoracic and lumbar spine exam is normal without tenderness, masses or kyphoscoliosis. Full range of motion without pain is noted. Lower extremities : DTRs 2+ knees and ankles bilateral.  FROM. Strength 5/5. Negative straight leg-raise. No edema or erythema bilateral.  normal peripheral pulses. Neuro: Cranial nerves 2-12 are normal. Deep tendon reflexes are 2+ and equal to all extremities. No focal sensory, or motor deficit noted. Skin: no rashes or suspicious lesions. ASSESSMENT:   1. Routine general medical examination at a health care facility  - Comprehensive Metabolic Panel; Future  - Lipid Panel; Future  - Hemoglobin A1C; Future  - CBC; Future    2. Hypothyroidism, unspecified  - Stable. Continue Synthroid 0.025 mg Sunday & Monday and 0.050 mg qd other days.   - SYNTHROID 50 MCG tablet; TAKE 1 TABLET BY MOUTH EVERY DAY. Dispense: 30 tablet; Refill: 0  - T4, Free; Future  - TSH; Future    3. Atrial fibrillation, unspecified type ([address]  - Stable. Continue Flecainide and Metoprolol XL qd .   - Followed by cardiologist . Discussed consideration for ablation given improvement in outcomes from years ago. - CBC; Future    4. Postpartum anxiety  - Stable. Continue Zoloft 50 mg qd. - sertraline (ZOLOFT) 50 MG tablet; TAKE 1.5 TABLETS BY MOUTH EVERY DAY  Dispense: 135 tablet; Refill: 3    5. Elevated glucose  - Comprehensive Metabolic Panel; Future  - Hemoglobin A1C; Future    6. Screening cholesterol level  - Lipid Panel; Future    7. Colon cancer screening  - Referral - Elsi Rich MD, Gastroenterology, [place]    8. Need for hepatitis C screening test  - Hepatitis C Antibody; Future        PLAN:  Follow a low fat, low cholesterol diet,  continue current healthy lifestyle patterns, including regular cardiovascular exercise >150 minutes per week,  and return for routine annual checkup  Yearly mammogram recommended , as well as monthly self breast exam.   Calcium 1200 mg/ day , Vitamin D 400 IU/ day, and weight bearing exercise. Follow up 6 -12 months/ prn.

## 2023-03-06 DIAGNOSIS — Z11.59 NEED FOR HEPATITIS C SCREENING TEST: ICD-10-CM

## 2023-03-06 DIAGNOSIS — Z13.220 SCREENING CHOLESTEROL LEVEL: ICD-10-CM

## 2023-03-06 DIAGNOSIS — I48.91 ATRIAL FIBRILLATION, UNSPECIFIED TYPE (HCC): ICD-10-CM

## 2023-03-06 DIAGNOSIS — E03.9 HYPOTHYROIDISM, UNSPECIFIED TYPE: ICD-10-CM

## 2023-03-06 DIAGNOSIS — R73.09 ELEVATED GLUCOSE: ICD-10-CM

## 2023-03-06 DIAGNOSIS — Z00.00 ROUTINE GENERAL MEDICAL EXAMINATION AT A HEALTH CARE FACILITY: ICD-10-CM

## 2023-03-06 LAB
A/G RATIO: 1.5 (ref 1.1–2.2)
ALBUMIN SERPL-MCNC: 4.3 G/DL (ref 3.4–5)
ALP BLD-CCNC: 57 U/L (ref 40–129)
ALT SERPL-CCNC: 8 U/L (ref 10–40)
ANION GAP SERPL CALCULATED.3IONS-SCNC: 12 MMOL/L (ref 3–16)
AST SERPL-CCNC: 10 U/L (ref 15–37)
BILIRUB SERPL-MCNC: 0.3 MG/DL (ref 0–1)
BUN BLDV-MCNC: 14 MG/DL (ref 7–20)
CALCIUM SERPL-MCNC: 9.3 MG/DL (ref 8.3–10.6)
CHLORIDE BLD-SCNC: 105 MMOL/L (ref 99–110)
CHOLESTEROL, TOTAL: 219 MG/DL (ref 0–199)
CO2: 25 MMOL/L (ref 21–32)
CREAT SERPL-MCNC: 0.7 MG/DL (ref 0.6–1.1)
GFR SERPL CREATININE-BSD FRML MDRD: >60 ML/MIN/{1.73_M2}
GLUCOSE BLD-MCNC: 98 MG/DL (ref 70–99)
HCT VFR BLD CALC: 39.1 % (ref 36–48)
HDLC SERPL-MCNC: 61 MG/DL (ref 40–60)
HEMOGLOBIN: 13.2 G/DL (ref 12–16)
HEPATITIS C ANTIBODY INTERPRETATION: NORMAL
LDL CHOLESTEROL CALCULATED: 141 MG/DL
MCH RBC QN AUTO: 30.2 PG (ref 26–34)
MCHC RBC AUTO-ENTMCNC: 33.9 G/DL (ref 31–36)
MCV RBC AUTO: 89.1 FL (ref 80–100)
PDW BLD-RTO: 13.1 % (ref 12.4–15.4)
PLATELET # BLD: 226 K/UL (ref 135–450)
PMV BLD AUTO: 9.7 FL (ref 5–10.5)
POTASSIUM SERPL-SCNC: 4.7 MMOL/L (ref 3.5–5.1)
RBC # BLD: 4.39 M/UL (ref 4–5.2)
SODIUM BLD-SCNC: 142 MMOL/L (ref 136–145)
T4 FREE: 1.1 NG/DL (ref 0.9–1.8)
TOTAL PROTEIN: 7.2 G/DL (ref 6.4–8.2)
TRIGL SERPL-MCNC: 85 MG/DL (ref 0–150)
TSH SERPL DL<=0.05 MIU/L-ACNC: 1.64 UIU/ML (ref 0.27–4.2)
VLDLC SERPL CALC-MCNC: 17 MG/DL
WBC # BLD: 4.4 K/UL (ref 4–11)

## 2023-03-07 LAB
ESTIMATED AVERAGE GLUCOSE: 108.3 MG/DL
HBA1C MFR BLD: 5.4 %

## 2023-03-23 DIAGNOSIS — E03.9 HYPOTHYROIDISM, UNSPECIFIED TYPE: ICD-10-CM

## 2023-03-23 NOTE — TELEPHONE ENCOUNTER
Requested Prescriptions     Pending Prescriptions Disp Refills    SYNTHROID 50 MCG tablet [Pharmacy Med Name: SYNTHROID 50 MCG TABLET] 30 tablet 0     Sig: TAKE 1 TABLET BY MOUTH EVERY DAY          Last Office Visit: 2/28/2023     Next Office Visit: Visit date not found     Last Labs: 3/6/23

## 2023-03-24 RX ORDER — LEVOTHYROXINE SODIUM 50 MCG
TABLET ORAL
Qty: 90 TABLET | Refills: 3 | Status: SHIPPED | OUTPATIENT
Start: 2023-03-24

## 2023-05-27 DIAGNOSIS — I48.91 ATRIAL FIBRILLATION, UNSPECIFIED TYPE (HCC): ICD-10-CM

## 2023-05-30 RX ORDER — APIXABAN 5 MG/1
TABLET, FILM COATED ORAL
Qty: 180 TABLET | OUTPATIENT
Start: 2023-05-30

## 2024-02-19 DIAGNOSIS — F41.8 POSTPARTUM ANXIETY: ICD-10-CM

## 2024-03-07 DIAGNOSIS — F41.8 POSTPARTUM ANXIETY: ICD-10-CM
